# Patient Record
Sex: FEMALE | Race: WHITE
[De-identification: names, ages, dates, MRNs, and addresses within clinical notes are randomized per-mention and may not be internally consistent; named-entity substitution may affect disease eponyms.]

---

## 2019-06-14 ENCOUNTER — HOSPITAL ENCOUNTER (EMERGENCY)
Dept: HOSPITAL 62 - ER | Age: 34
LOS: 1 days | Discharge: HOME | End: 2019-06-15
Payer: COMMERCIAL

## 2019-06-14 DIAGNOSIS — R06.02: ICD-10-CM

## 2019-06-14 DIAGNOSIS — Z88.5: ICD-10-CM

## 2019-06-14 DIAGNOSIS — T78.2XXA: Primary | ICD-10-CM

## 2019-06-14 DIAGNOSIS — Z88.1: ICD-10-CM

## 2019-06-14 DIAGNOSIS — R00.0: ICD-10-CM

## 2019-06-14 DIAGNOSIS — L50.9: ICD-10-CM

## 2019-06-14 PROCEDURE — 96374 THER/PROPH/DIAG INJ IV PUSH: CPT

## 2019-06-14 PROCEDURE — S0028 INJECTION, FAMOTIDINE, 20 MG: HCPCS

## 2019-06-14 PROCEDURE — 96372 THER/PROPH/DIAG INJ SC/IM: CPT

## 2019-06-14 PROCEDURE — 96361 HYDRATE IV INFUSION ADD-ON: CPT

## 2019-06-14 PROCEDURE — 99291 CRITICAL CARE FIRST HOUR: CPT

## 2019-06-14 PROCEDURE — 96375 TX/PRO/DX INJ NEW DRUG ADDON: CPT

## 2019-06-14 NOTE — ER DOCUMENT REPORT
ED General





- General


Chief Complaint: Allergic Reaction


Stated Complaint: ALLERGIC REACTION


Time Seen by Provider: 06/14/19 21:40


Notes: 





Patient is a 34-year-old female with a past medical history of anaphylactic 

reactions who presents with diffuse hives and difficulty breathing.  States that

her symptoms started approximately 1 hour prior to arrival.  States that the 

symptoms came on abruptly without triggering.  She self-administered Benadryl 

and epinephrine injection with no significant improvement prompting her to come 

to the emergency department.  Patient states that she is had similar allergic 

reactions in the past but they usually respond to epinephrine.  Symptoms are 

regarded as severe, constant, diffuse pruritus and mild to moderate difficulty 

breathing.  No vomiting, diarrhea, abdominal cramping or syncope.


TRAVEL OUTSIDE OF THE [place] IN LAST 30 DAYS: No





- Related Data


Allergies/Adverse Reactions: 


                                        





morphine Allergy (Mild, Verified 06/14/19 21:28)


   


vancomycin Allergy (Mild, Verified 06/14/19 21:27)


   











Past Medical History





- General


Information source: Patient





- Social History


Smoking Status: Never Smoker


Frequency of alcohol use: None


Drug Abuse: None


Family History: Reviewed & Not Pertinent





Review of Systems





- Review of Systems


Notes: 





Constitutional: Negative for fever.


HENT: Negative for sore throat.


Eyes: Negative for visual changes.


Cardiovascular: Negative for chest pain.


Respiratory: Positive for shortness of breath.


Gastrointestinal: Negative for abdominal pain, vomiting or diarrhea.


Genitourinary: Negative for dysuria.


Musculoskeletal: Negative for back pain.


Skin: Positive for rash.


Neurological: Negative for headaches, weakness or numbness.





10 point ROS negative except as marked above and in HPI.





Physical Exam





- Vital signs


Vitals: 


                                        











Temp Pulse Resp BP Pulse Ox


 


 97.6 F   123 H  26 H  133/85 H  97 


 


 06/14/19 21:28  06/14/19 21:28  06/14/19 21:28  06/14/19 21:28  06/14/19 21:28











Interpretation: Tachycardic, Tachypneic


Notes: 





PHYSICAL EXAMINATION:





GENERAL: Appears mildly unwell, no overt distress





HEAD: Atraumatic, normocephalic.





EYES: Pupils equal round and reactive to light, extraocular movements intact, 

sclera anicteric, conjunctiva are normal.





ENT: nares patent, oropharynx clear without exudates.  Moist mucous membranes.  

No oropharyngeal edema





NECK: Normal range of motion, supple without lymphadenopathy





LUNGS: Breath sounds clear to auscultation bilaterally and equal.  No wheezes 

rales or rhonchi.





HEART: Regular tachycardia without murmurs





ABDOMEN: Soft, nontender, normoactive bowel sounds.  No guarding, no rebound.  

No masses appreciated.





EXTREMITIES: Normal range of motion, no pitting or edema.  No cyanosis.





NEUROLOGICAL: No focal neurological deficits. Moves all extremities 

spontaneously and on command.





PSYCH: Normal mood, normal affect.





SKIN: Warm, Dry, normal turgor, diffuse urticarial lesions over the face, chest,

back, bilateral upper extremities





Course





- Re-evaluation


Re-evalutation: 





06/14/19 21:40


Patient presents covered diffusely and urticaria, has Marvin self-administered 

epinephrine prior to coming to the hospital.  States that she was having 

wheezing and tightness of her throat at that time which has apparently improved 

after the initial dose of epinephrine that she self-administered.  She has 

however covered diffusely in hives, remains tachycardic and complains of ongoing

shortness of breath although does not have stridor or wheezing on exam.  The 

patient will receive an additional dose of epinephrine 0.5 mg IM, IV will be 

established and dexamethasone, famotidine and Benadryl will be administered.  

Will continue to reassess at regular intervals.


06/14/19 22:17


Patient's hives persist although she is no longer short of breath.  Saturating 

100% on room air.  In no distress.  A second dose of epinephrine will be 

administered.


06/14/19 23:29


Patient is having significant improvement of her hives although they are not 

completely resolved.  An additional dose of intramuscular epinephrine 0.5 mg 

will be administered.  Will continue to reassess.


06/15/19 00:42


Patient has had resolution of hives, completely asymptomatic at this point.  

Feels comfortable going home.  Already has extra EpiPen pens at home.  At this 

time will discharge with return precautions and follow-up recommendations.  

Verbal discharge instructions given a the bedside and opportunity for questions 

given. Medication warnings reviewed. Patient is in agreement with this plan and 

has verbalized understanding of return precautions and the need for primary care

follow-up in the next 24-72 hours.





- Vital Signs


Vital signs: 


                                        











Temp Pulse Resp BP Pulse Ox


 


 97.6 F   123 H  25 H  138/66 H  96 


 


 06/14/19 21:28  06/14/19 21:28  06/15/19 00:51  06/15/19 00:51  06/15/19 00:51














Critical Care Note





- Critical Care Note


Total time excluding time spent on procedures (mins): 36


Comments: 





Critical care time spent obtaining history from patient or surrogate, 

development of treatment plan with patient or surrogate, evaluation of patient's

response to treatment, examination of patient, ordering and performing 

treatments and interventions, re-evaluation of patient's condition





Discharge





- Discharge


Clinical Impression: 


 Urticaria, Shortness of breath





Anaphylaxis


Qualifiers:


 Encounter type: initial encounter Qualified Code(s): T78.2XXA - Anaphylactic 

shock, unspecified, initial encounter





Condition: Good


Disposition: HOME, SELF-CARE


Additional Instructions: 


IF YOU DEVELOP DIFFICULTY BREATHING, RETURN OF HIVES, VOMITING, LIGHTHEADEDNESS,

GIVE YOURSELF THE EPINEPHRINE SHOT IMMEDIATELY AND CALL 911. NEVER HESITATE TO 

GIVE YOURSELF THE EPINEPHRINE AS THIS CAN SAVE YOUR LIFE IF YOU ARE HAVE A 

SERIOUS ALLERGIC REACTION.

## 2019-06-15 VITALS — SYSTOLIC BLOOD PRESSURE: 138 MMHG | DIASTOLIC BLOOD PRESSURE: 66 MMHG
